# Patient Record
Sex: FEMALE | Race: WHITE | Employment: FULL TIME | URBAN - METROPOLITAN AREA
[De-identification: names, ages, dates, MRNs, and addresses within clinical notes are randomized per-mention and may not be internally consistent; named-entity substitution may affect disease eponyms.]

---

## 2017-03-13 PROCEDURE — 87591 N.GONORRHOEAE DNA AMP PROB: CPT | Performed by: FAMILY MEDICINE

## 2017-03-13 PROCEDURE — 87491 CHLMYD TRACH DNA AMP PROBE: CPT | Performed by: FAMILY MEDICINE

## 2017-03-18 PROCEDURE — 87389 HIV-1 AG W/HIV-1&-2 AB AG IA: CPT | Performed by: FAMILY MEDICINE

## 2017-03-18 PROCEDURE — 86780 TREPONEMA PALLIDUM: CPT | Performed by: FAMILY MEDICINE

## 2017-03-18 PROCEDURE — 80074 ACUTE HEPATITIS PANEL: CPT | Performed by: FAMILY MEDICINE

## 2017-11-22 PROCEDURE — 86803 HEPATITIS C AB TEST: CPT | Performed by: OBSTETRICS & GYNECOLOGY

## 2017-11-22 PROCEDURE — 87491 CHLMYD TRACH DNA AMP PROBE: CPT | Performed by: OBSTETRICS & GYNECOLOGY

## 2017-11-22 PROCEDURE — 87624 HPV HI-RISK TYP POOLED RSLT: CPT | Performed by: OBSTETRICS & GYNECOLOGY

## 2017-11-22 PROCEDURE — 87389 HIV-1 AG W/HIV-1&-2 AB AG IA: CPT | Performed by: OBSTETRICS & GYNECOLOGY

## 2017-11-22 PROCEDURE — 36415 COLL VENOUS BLD VENIPUNCTURE: CPT | Performed by: OBSTETRICS & GYNECOLOGY

## 2017-11-22 PROCEDURE — 87340 HEPATITIS B SURFACE AG IA: CPT | Performed by: OBSTETRICS & GYNECOLOGY

## 2017-11-22 PROCEDURE — 87591 N.GONORRHOEAE DNA AMP PROB: CPT | Performed by: OBSTETRICS & GYNECOLOGY

## 2017-11-22 PROCEDURE — 86780 TREPONEMA PALLIDUM: CPT | Performed by: OBSTETRICS & GYNECOLOGY

## 2017-11-22 PROCEDURE — 88175 CYTOPATH C/V AUTO FLUID REDO: CPT | Performed by: OBSTETRICS & GYNECOLOGY

## 2018-11-28 PROCEDURE — 88175 CYTOPATH C/V AUTO FLUID REDO: CPT | Performed by: OBSTETRICS & GYNECOLOGY

## 2018-12-24 PROBLEM — K62.5 RECTAL BLEEDING: Status: ACTIVE | Noted: 2018-12-24

## 2018-12-24 PROBLEM — K60.2 ANAL FISSURE: Status: ACTIVE | Noted: 2018-12-24

## 2018-12-24 PROBLEM — K62.89 RECTAL PAIN: Status: ACTIVE | Noted: 2018-12-24

## 2019-03-27 PROBLEM — S29.012D: Status: ACTIVE | Noted: 2019-03-27

## 2019-06-28 PROCEDURE — 36415 COLL VENOUS BLD VENIPUNCTURE: CPT | Performed by: INTERNAL MEDICINE

## 2019-06-28 PROCEDURE — 86480 TB TEST CELL IMMUN MEASURE: CPT | Performed by: INTERNAL MEDICINE

## 2020-03-10 PROCEDURE — 88305 TISSUE EXAM BY PATHOLOGIST: CPT | Performed by: OBSTETRICS & GYNECOLOGY

## 2021-03-29 PROCEDURE — 88342 IMHCHEM/IMCYTCHM 1ST ANTB: CPT | Performed by: OBSTETRICS & GYNECOLOGY

## 2021-03-29 PROCEDURE — 88305 TISSUE EXAM BY PATHOLOGIST: CPT | Performed by: OBSTETRICS & GYNECOLOGY

## 2025-01-04 ENCOUNTER — HOSPITAL ENCOUNTER (OUTPATIENT)
Age: 40
Discharge: HOME | End: 2025-01-04
Payer: COMMERCIAL

## 2025-01-04 VITALS
DIASTOLIC BLOOD PRESSURE: 69 MMHG | OXYGEN SATURATION: 97 % | HEART RATE: 77 BPM | SYSTOLIC BLOOD PRESSURE: 119 MMHG | TEMPERATURE: 99 F | RESPIRATION RATE: 18 BRPM

## 2025-01-04 DIAGNOSIS — J11.1 INFLUENZA: Primary | ICD-10-CM

## 2025-01-04 LAB
POCT INFLUENZA A: POSITIVE
POCT INFLUENZA B: NEGATIVE
SARS-COV-2 RNA RESP QL NAA+PROBE: NOT DETECTED

## 2025-01-04 PROCEDURE — 99203 OFFICE O/P NEW LOW 30 MIN: CPT

## 2025-01-04 PROCEDURE — 87502 INFLUENZA DNA AMP PROBE: CPT | Performed by: NURSE PRACTITIONER

## 2025-01-04 RX ORDER — BENZONATATE 100 MG/1
100 CAPSULE ORAL 3 TIMES DAILY PRN
Qty: 20 CAPSULE | Refills: 0 | Status: SHIPPED | OUTPATIENT
Start: 2025-01-04 | End: 2025-01-11

## 2025-01-04 NOTE — ED PROVIDER NOTES
Patient Seen in: [facility]    History   CC: cough  HPI: Smith Read 39 year old female  who presents c/o harsh cough, congestion, fatigue, fevers beginning 1/1. Denies augustus, cp, GI signs/symptoms, fever, rash, hemoptysis.    Past Medical History:    LGSIL on Pap smear of cervix    Vitamin D deficiency            Past Surgical History:   Procedure Laterality Date    Colonoscopy  2019    Paragard, iud  05/17/2019       Family History   Problem Relation Age of Onset    Hypertension Father     Cancer Neg     Diabetes Neg        Social History     Socioeconomic History    Marital status: Single   Tobacco Use    Smoking status: Never    Smokeless tobacco: Never   Vaping Use    Vaping status: Never Used   Substance and Sexual Activity    Alcohol use: Yes     Alcohol/week: 1.0 standard drink of alcohol     Types: 1 Cans of beer per week     Comment: social    Drug use: No    Sexual activity: Yes     Partners: Male     Birth control/protection: I.U.D., Mirena       ROS:  Systems reviewed: All pertinent positives noted in HPI. Unless otherwise noted, additional systems reviewed are negative.   Vital signs reviewed.    Positive for stated complaint: cough  Other systems are as noted in HPI.  Constitutional and vital signs reviewed.      All other systems reviewed and negative except as noted above.    PSFH elements reviewed from today and agreed except as otherwise stated in HPI.             Constitutional and vital signs reviewed.        Physical Exam     ED Triage Vitals [01/04/25 0824]   /69   Pulse 77   Resp 18   Temp 99.1 °F (37.3 °C)   Temp src Oral   SpO2 97 %   O2 Device None (Room air)       Current:/69   Pulse 77   Temp 99.1 °F (37.3 °C) (Oral)   Resp 18   SpO2 97%         PE:  General - Appears well, non-toxic and in NAD  Head - Appears symmetrical without deformity/swelling cranium, scalp, or facial bones  Eyes - sclera not injected, no discharge noted, no periorbital edema  ENT -  EAC bilaterally without discharge, TM pearly grey with COL visualized appropriately bilaterally.   no nasal drainage noted in nares bilat, no cobblestoning to post. Pharynx.   Oropharynx clear, posterior pharynx is without erythema and without tonsilar enlargement or exudate, uvula midline, +gag, voice is clear. No trismus  Neck - no significant adenopathy, supple with trachea midline  Resp - Lung sounds clear bilaterally and wob unlabored, good aeration with equal, even expansion bilaterally   CV - RRR  Skin - no rashes or petechiae noted, pink warm and dry throughout, mmm, cap refill <2seconds  Neuro - A&O x4, steady gait  MSK - makes purposeful movements of all extremities, radial pulses 2+ bilat.  Psych - Interactive and appropriate      ED Course     Labs Reviewed   POCT FLU TEST - Abnormal; Notable for the following components:       Result Value    POCT INFLUENZA A Positive (*)     All other components within normal limits    Narrative:     This assay is a rapid molecular in vitro test utilizing nucleic acid amplification of influenza A and B viral RNA.   RAPID SARS-COV-2 BY PCR - Normal       MDM     DDx: influenza, covid 19, unspecified viral illness     Influenza and viral illness instructions reviewed, rest, hydration instructions, Tylenol or Motrin as needed for discomfort, cough suppressant as prescribed and indications/precautions and use reviewed influenza A positive.  General, follow-up and return/ED precautions reviewed. Patient is historian and demonstrates understanding of all instruction and agrees with plan of care.      Disposition and Plan     Clinical Impression:  1. Influenza        Disposition:  Discharge    Follow-up:  Mike Lr DO  [address] [facility] [address] [phone]    Go in 1 week  As needed      Medications Prescribed:  Discharge Medication List as of 1/4/2025  8:58 AM        START taking these medications    Details   benzonatate 100 MG Oral Cap Take 1 capsule  (100 mg total) by mouth 3 (three) times daily as needed for cough., Normal, Disp-20 capsule, R-0

## 2025-01-04 NOTE — ED INITIAL ASSESSMENT (HPI)
Cough, headache, mild sinus congestion since Wednesday, no fever, no sob, no sore throat, had 2 - covid home tests

## 2025-03-09 NOTE — LETTER
Date & Time: 1/4/2025, 9:22 AM  Patient: Smith Read  Encounter Provider(s):    Zaina Haas APRN       To Whom It May Concern:    Smith Read was seen and treated in our department on 1/4/2025. She  should be excused from work through 1/4/2025 .    If you have any questions or concerns, please do not hesitate to call.        _____________________________  Physician/APC Signature           
No

## 2025-03-12 ENCOUNTER — HOSPITAL ENCOUNTER (OUTPATIENT)
Age: 40
Discharge: HOME | End: 2025-03-12
Attending: STUDENT IN AN ORGANIZED HEALTH CARE EDUCATION/TRAINING PROGRAM
Payer: COMMERCIAL

## 2025-03-12 VITALS
HEART RATE: 64 BPM | SYSTOLIC BLOOD PRESSURE: 116 MMHG | DIASTOLIC BLOOD PRESSURE: 65 MMHG | TEMPERATURE: 99 F | OXYGEN SATURATION: 100 % | RESPIRATION RATE: 18 BRPM

## 2025-03-12 DIAGNOSIS — M54.9 BACK PAIN WITHOUT RADIATION: Primary | ICD-10-CM

## 2025-03-12 PROCEDURE — 99213 OFFICE O/P EST LOW 20 MIN: CPT

## 2025-03-12 RX ORDER — METHYLPREDNISOLONE 4 MG/1
TABLET ORAL
Qty: 1 EACH | Refills: 0 | Status: SHIPPED | OUTPATIENT
Start: 2025-03-12

## 2025-03-12 NOTE — DISCHARGE INSTRUCTIONS
Your exam is consistent with likely musculoskeletal pain of the back for which I have prescribed a steroid called a Medrol Dosepak to help decrease inflammation, do not take any NSAIDs such as ibuprofen/Advil/naproxen/Aleve/Motrin while taking this medication.  Continue to rest, no straining, no lifting, no sports, no physical activity, until following up with your primary care physician in 4 to 5 days for reassessment of your clinical course and for further recommendations.    You can apply an ice pack or heat pack 3 times a day, 10 minutes each time, with a barrier such as a towel between the skin and the ice or heat pack so as to prevent skin injury.    With any progression of pain, blood in the urine, abdominal pain, vomiting, chest pain, shortness of breath, I do recommend immediate assessment in the emergency department.

## 2025-03-12 NOTE — ED INITIAL ASSESSMENT (HPI)
States she took a boxing class 5 days ago and was doing \"kettle bells\". Woke up the next day with left thoracic back pain. Describes as pulling sensation with difficulty sleeping. Feels SOB when lying down. Taking Ibuprofen with some relief.

## 2025-03-12 NOTE — ED PROVIDER NOTES
Patient Seen in: [facility]      History     Chief Complaint   Patient presents with    Back Pain     Stated Complaint: back pain    Subjective:   HPI      39-year-old female with no significant past medical history, who presents with concern for left upper back pain for about 4 days.  Patient states about 24 hours prior to symptom onset, she was doing a workout class where she worked with weights and a kettle bell.  She does not recall injury, but 24 hours later symptoms started.  Some symptoms have resolved, but feels a pulling sensation with laying flat, she can now take a deep breath in as before she could not take a deep breath in due to exacerbation of pain in the LT mid to upper back.  She states that reaching down with her left upper extremity, as if picking something up, exacerbates symptoms.  She has been applying heat, ice, and taking Tylenol and ibuprofen with some improvement in her symptoms.  She states she had a previous low back injury about 8 years ago for which she did work with physical therapy, muscle relaxer did not help with her symptoms at that time.  She denies any numbness or tingling or weakness in the arms or the legs, she denies any chest pain or difficulty breathing, she denies any abdominal pain or vomiting.    Objective:     Past Medical History:    LGSIL on Pap smear of cervix    Vitamin D deficiency                   Past Surgical History:   Procedure Laterality Date    Colonoscopy  2019    Paragard, iud  05/17/2019                Social History     Socioeconomic History    Marital status: Single   Tobacco Use    Smoking status: Never    Smokeless tobacco: Never   Vaping Use    Vaping status: Never Used   Substance and Sexual Activity    Alcohol use: Yes     Alcohol/week: 1.0 standard drink of alcohol     Types: 1 Cans of beer per week     Comment: social    Drug use: No    Sexual activity: Yes     Partners: Male     Birth control/protection: I.U.D., Mirena               Review of Systems    Positive for stated complaint: back pain  Other systems are as noted in HPI.  Constitutional and vital signs reviewed.      All other systems reviewed and negative except as noted above.    Physical Exam     ED Triage Vitals [03/12/25 1706]   /65   Pulse 64   Resp 18   Temp 98.7 °F (37.1 °C)   Temp src Oral   SpO2 100 %   O2 Device None (Room air)       Current Vitals:   Vital Signs  BP: 116/65  Pulse: 64  Resp: 18  Temp: 98.7 °F (37.1 °C)  Temp src: Oral    Oxygen Therapy  SpO2: 100 %  O2 Device: None (Room air)        Physical Exam    General: Awake, alert, comfortable on room air, in no distress, tolerating oral secretions, interactive  Pulmonary: Lungs CTA B, no wheezing, no conversational dyspnea  Cardiac: +2 B/L regular radial pulses  GI: Abdomen soft, nontender, nondistended, no rebound, no guarding  Neuro: Intact sensation and motor function of B/L radial, median, and ulnar nerves, negative B/L straight leg raise  Musculoskeletal: No B/L CVA tenderness, 5/5 B/L  strength and plantarflexion and dorsiflexion, negative B/L drop arm sign, no pain exacerbation with abduction of the left shoulder or external or internal rotation of the left shoulder, with standing and reaching down like she is going to pick something up there is exacerbation of pain in the left mid to upper back over the latissimus dorsi and trapezius, there is reproducible TTP over the superior left sided latissimus dorsi and inferior left-sided trapezius, no overlying skin changes or palpable hematomas  HEENT: No periorbital edema or erythema  Skin: No rash or erythema or ecchymosis or hematoma of the back  Psych: Normal mood, normal affect    ED Course   No labs or imaging performed  MDM   Suspect muscle strain, no blunt trauma to suspect fracture, tenderness is reproducible over the left superior latissimus dorsi and inferior trapezius, no overlying skin changes with no sign of hematoma or ecchymosis or  shingles, no B/L CVA tenderness and no urinary symptoms with no suspicion for pyelonephritis or nephrolithiasis, no low back symptoms and intact strength of both the upper and lower extremities, no TTP throughout the abdomen with no sign of an acute abdomen that could be causing radiation to the back that would explain her symptoms, no suspicion for cardiac etiology or PE at this time given she has reproducible tenderness to palpation and with range of motion as well as symptoms followed an exercise class all more consistent with likely muscle strain/spasm, patient is neurovascular intact  -We discussed symptomatic management with rest, no heavy lifting, no straining, safe application of heat or ice packs, over-the-counter Tylenol or ibuprofen if needed for pain control, and have also prescribed a Medrol Dosepak in the setting of likely muscle strain.  Patient denies any history of diabetes, no contraindication to steroid.  -Work note offered but declined by pt  -Patient is to follow-up with her primary care physician within 4 to 5 days of symptomatic management for reassessment of her clinical course and for further recommendations.    Medical Decision Making  Risk  OTC drugs.  Prescription drug management.        Disposition and Plan     Clinical Impression:  1. Back pain without radiation         Disposition:  Discharge  3/12/2025  6:07 pm    Follow-up:  Mike Lr DO  [address] [facility] [address] [phone]    In 4 days            Medications Prescribed:  Current Discharge Medication List        START taking these medications    Details   methylPREDNISolone (MEDROL) 4 MG Oral Tablet Therapy Pack Dosepack: take as directed  Qty: 1 each, Refills: 0